# Patient Record
Sex: MALE | Race: ASIAN | NOT HISPANIC OR LATINO | URBAN - METROPOLITAN AREA
[De-identification: names, ages, dates, MRNs, and addresses within clinical notes are randomized per-mention and may not be internally consistent; named-entity substitution may affect disease eponyms.]

---

## 2020-08-28 ENCOUNTER — WEB ENCOUNTER (OUTPATIENT)
Dept: URBAN - METROPOLITAN AREA CLINIC 35 | Facility: CLINIC | Age: 45
End: 2020-08-28

## 2020-08-30 ENCOUNTER — WEB ENCOUNTER (OUTPATIENT)
Dept: URBAN - METROPOLITAN AREA CLINIC 35 | Facility: CLINIC | Age: 45
End: 2020-08-30

## 2020-09-02 ENCOUNTER — OFFICE VISIT (OUTPATIENT)
Dept: URBAN - METROPOLITAN AREA CLINIC 37 | Facility: CLINIC | Age: 45
End: 2020-09-02

## 2020-09-21 ENCOUNTER — OFFICE VISIT (OUTPATIENT)
Dept: URBAN - METROPOLITAN AREA CLINIC 33 | Facility: CLINIC | Age: 45
End: 2020-09-21

## 2020-09-21 ENCOUNTER — DASHBOARD ENCOUNTERS (OUTPATIENT)
Age: 45
End: 2020-09-21

## 2020-09-21 VITALS
WEIGHT: 144 LBS | HEIGHT: 67 IN | BODY MASS INDEX: 22.6 KG/M2 | SYSTOLIC BLOOD PRESSURE: 108 MMHG | DIASTOLIC BLOOD PRESSURE: 72 MMHG | TEMPERATURE: 96.1 F

## 2020-09-21 PROBLEM — 79922009 EPIGASTRIC PAIN: Status: ACTIVE | Noted: 2020-09-21

## 2020-09-21 PROBLEM — 267024001 ABNORMAL WEIGHT LOSS: Status: ACTIVE | Noted: 2020-09-21

## 2020-09-21 PROBLEM — 16331000 HEARTBURN: Status: ACTIVE | Noted: 2020-09-21

## 2020-09-21 RX ORDER — LEVOTHYROXINE SODIUM 88 UG/1
TAKE 1 TABLET EVERY DAY TABLET ORAL
Qty: 90 UNSPECIFIED | Refills: 0 | Status: ACTIVE | COMMUNITY

## 2020-09-21 NOTE — EXAM-MIGRATED EXAMINATIONS
GENERAL APPEARANCE: - alert, in no acute distress, well developed, well nourished;   HEAD: - normocephalic, atraumatic;   ORAL CAVITY: - mallampati class II;   HEART: - no murmurs, regular rate and rhythm, S1, S2 normal;   LUNGS: - clear to auscultation bilaterally, good air movement, no wheezes, rales, rhonchi;   ABDOMEN: - bowel sounds present, no masses palpable, no organomegaly , no rebound tenderness, soft, nontender, nondistended;

## 2020-09-21 NOTE — HPI-MIGRATED HPI
;     burning in stomach :   45 year old male patient presents for burning sensation un stomah consult. Patient states symptoms began 3 weeks ago _. Patient admits  taking 2 week course of OTC Prilosec for relief of symptoms. Patient describes symptoms as burning sensation around the belly button area and states they are most present after meals or at night while he's sleeping . Patient admits nighttime symptoms.    Patient does admits symptoms have subsided since taking medications but he still wanted evaluation of symptoms.    Patient denies nausea or vomiting.   Patient denies any associated weight loss, but of note he does admits weight loss due to thyroid medication.   Patient denies EGD in the past.  ;

## 2020-09-26 ENCOUNTER — OFFICE VISIT (OUTPATIENT)
Dept: URBAN - METROPOLITAN AREA SURGERY CENTER 8 | Facility: SURGERY CENTER | Age: 45
End: 2020-09-26